# Patient Record
Sex: FEMALE | Race: WHITE | HISPANIC OR LATINO | Employment: UNEMPLOYED | URBAN - METROPOLITAN AREA
[De-identification: names, ages, dates, MRNs, and addresses within clinical notes are randomized per-mention and may not be internally consistent; named-entity substitution may affect disease eponyms.]

---

## 2019-10-16 ENCOUNTER — HOSPITAL ENCOUNTER (EMERGENCY)
Facility: HOSPITAL | Age: 34
Discharge: HOME | End: 2019-10-16
Attending: EMERGENCY MEDICINE
Payer: MEDICAID

## 2019-10-16 VITALS
HEART RATE: 79 BPM | SYSTOLIC BLOOD PRESSURE: 141 MMHG | DIASTOLIC BLOOD PRESSURE: 69 MMHG | HEIGHT: 69 IN | RESPIRATION RATE: 16 BRPM | TEMPERATURE: 99 F | BODY MASS INDEX: 36.9 KG/M2 | WEIGHT: 249.13 LBS | OXYGEN SATURATION: 96 %

## 2019-10-16 DIAGNOSIS — R03.0 ELEVATED BLOOD PRESSURE READING: ICD-10-CM

## 2019-10-16 DIAGNOSIS — R10.9 LEFT FLANK PAIN: Primary | ICD-10-CM

## 2019-10-16 DIAGNOSIS — N39.0 URINARY TRACT INFECTION WITHOUT HEMATURIA, SITE UNSPECIFIED: ICD-10-CM

## 2019-10-16 LAB
B-HCG UR QL: NEGATIVE
BILIRUB UR QL STRIP: NEGATIVE
CLARITY UR: CLEAR
COLOR UR: YELLOW
GLUCOSE UR QL STRIP: NEGATIVE
HGB UR QL STRIP: NEGATIVE
KETONES UR QL STRIP: NEGATIVE
LEUKOCYTE ESTERASE UR QL STRIP: ABNORMAL
MICROSCOPIC COMMENT: NORMAL
NITRITE UR QL STRIP: NEGATIVE
PH UR STRIP: 6 [PH] (ref 5–8)
PROT UR QL STRIP: NEGATIVE
SP GR UR STRIP: >=1.03 (ref 1–1.03)
URN SPEC COLLECT METH UR: ABNORMAL
UROBILINOGEN UR STRIP-ACNC: NEGATIVE EU/DL
WBC #/AREA URNS HPF: 2 /HPF (ref 0–5)

## 2019-10-16 PROCEDURE — 81025 URINE PREGNANCY TEST: CPT

## 2019-10-16 PROCEDURE — 99284 EMERGENCY DEPT VISIT MOD MDM: CPT | Mod: 25

## 2019-10-16 PROCEDURE — 81000 URINALYSIS NONAUTO W/SCOPE: CPT

## 2019-10-16 NOTE — ED NOTES
Pt c/o (L) flank pain x 1 week , with nausea.    Patient identifiers verified and correct for  Olivia Niel     LOC: The patient is awake, alert and aware of environment with an appropriate affect, the patient is oriented x 3 and speaking appropriately.  APPEARANCE: Patient resting comfortably and in no acute distress, patient is clean and well groomed, patient's clothing is properly fastened.  SKIN: The skin is warm and dry, color consistent with ethnicity, patient has normal skin turgor and moist mucus membranes, skin intact, no breakdown or bruising noted.   MUSCULOSKELETAL: Patient moving all extremities spontaneously, no obvious swelling or deformities noted.  RESPIRATORY: Airway is open and patent, respirations are spontaneous, patient has a normal effort and rate, no accessory muscle use noted, bilateral breath sounds clear.  CARDIAC: Patient has a normal rate and regular rhythm, no periphreal edema noted, capillary refill < 3 seconds.  ABDOMEN: Soft and non tender to palpation, no distention noted, normoactive bowel sounds present in all four quadrants.  NEUROLOGIC: PERRL, eyes open spontaneously, behavior appropriate to situation, follows commands, facial expression symmetrical, bilateral hand grasp equal and even, purposeful motor response noted, normal sensation in all extremities when touched with a finger.

## 2019-10-16 NOTE — ED PROVIDER NOTES
SCRIBE #1 NOTE: I, Robert Alonzo, am scribing for, and in the presence of, Joya Rm PA-C. I have scribed the entire note.       History     Chief Complaint   Patient presents with    Flank Pain     L flank x 1 week +nausea      Review of patient's allergies indicates:   Allergen Reactions    Pcn [penicillins]          History of Present Illness     HPI    10/16/2019, 5:21 PM   History obtained from the patient      History of Present Illness: Olivia Neil is a 34 y.o. female patient who presents to the Emergency Department for evaluation of L flank pain which onset gradually 2 week PTA.  Pt was seen at [place] just PTA and was diagnosed with a UTI and given abx but has not started. Pt reported to the ED because no imaging was preformed at [place].   Pt was given prescription for Levaquin and administered bentyl, Zofran, and a GI cocktail at [facility]. CBC showed slightly elevated WBC 11.13 with reference range of 4.3-10.3. CMP was normal results. Pregnancy test was negative. Urinalysis was positive for leukocyte esterase. Symptoms are worsening and moderate in severity. No mitigating or exacerbating factors reported. Associated sxs include nausea and increased burping. Patient denies any fever, chills, SOB, CP, abd pain, vomiting, constipation, dysuria, hematuria, back pain, HA, weakness, dizziness, and all other sxs at this time. Prior Tx includes abx but pt has not started taking them. No further complaints or concerns at this time.        Arrival mode: Personal vehicle    PCP: Primary Doctor No        Past Medical History:  History reviewed. No pertinent past medical history.    Past Surgical History:  Past Surgical History:   Procedure Laterality Date    CHOLECYSTECTOMY           Family History:  History reviewed. No pertinent family history.    Social History:  Social History     Tobacco Use    Smoking status: Never Smoker    Smokeless tobacco: Never Used   Substance and  Sexual Activity    Alcohol use: Yes    Drug use: Unknown    Sexual activity: Unknown        Review of Systems     Review of Systems   Constitutional: Negative for chills and fever.   HENT: Negative for sore throat.    Respiratory: Negative for shortness of breath.    Cardiovascular: Negative for chest pain.   Gastrointestinal: Positive for nausea. Negative for abdominal pain, constipation and vomiting.        (+) increasing belching   Genitourinary: Positive for flank pain (L). Negative for dysuria and hematuria.   Musculoskeletal: Negative for back pain.   Skin: Negative for rash.   Neurological: Negative for dizziness, weakness and headaches.   Hematological: Does not bruise/bleed easily.   All other systems reviewed and are negative.       Physical Exam     Initial Vitals [10/16/19 1716]   BP Pulse Resp Temp SpO2   (!) 141/69 79 16 98.7 °F (37.1 °C) 96 %      MAP       --          Physical Exam  Nursing Notes and Vital Signs Reviewed.  Constitutional: Patient is in no acute distress. Well-developed and well-nourished.  Head: Atraumatic. Normocephalic.  Eyes: PERRL. EOM intact. Conjunctivae are not pale. No scleral icterus.  ENT: Mucous membranes are moist. Oropharynx is clear and symmetric.    Neck: Supple. Full ROM. No lymphadenopathy.  Cardiovascular: Regular rate. Regular rhythm. No murmurs, rubs, or gallops. Distal pulses are 2+ and symmetric.  Pulmonary/Chest: No respiratory distress. Clear to auscultation bilaterally. No wheezing or rales.  Abdominal: Soft and non-distended.  There is no tenderness.  No rebound, guarding, or rigidity.  Genitourinary: L CVA tenderness  Musculoskeletal: Moves all extremities. No obvious deformities. No edema. No calf tenderness.  Skin: Warm and dry.  Neurological:  Alert, awake, and appropriate.  Normal speech.  No acute focal neurological deficits are appreciated.  Psychiatric: Normal affect. Good eye contact. Appropriate in content.     ED Course   Procedures  ED Vital  "Signs:  Vitals:    10/16/19 1716   BP: (!) 141/69   Pulse: 79   Resp: 16   Temp: 98.7 °F (37.1 °C)   TempSrc: Oral   SpO2: 96%   Weight: 113 kg (249 lb 1.9 oz)   Height: 5' 9" (1.753 m)       Abnormal Lab Results:  Labs Reviewed   URINALYSIS, REFLEX TO URINE CULTURE - Abnormal; Notable for the following components:       Result Value    Specific Gravity, UA >=1.030 (*)     Leukocytes, UA Trace (*)     All other components within normal limits    Narrative:     Preferred Collection Type->Urine, Clean Catch   PREGNANCY TEST, URINE RAPID   URINALYSIS MICROSCOPIC    Narrative:     Preferred Collection Type->Urine, Clean Catch        All Lab Results:  Results for orders placed or performed during the hospital encounter of 10/16/19   Urinalysis, Reflex to Urine Culture Urine, Clean Catch   Result Value Ref Range    Specimen UA Urine, Clean Catch     Color, UA Yellow Yellow, Straw, Martha    Appearance, UA Clear Clear    pH, UA 6.0 5.0 - 8.0    Specific Gravity, UA >=1.030 (A) 1.005 - 1.030    Protein, UA Negative Negative    Glucose, UA Negative Negative    Ketones, UA Negative Negative    Bilirubin (UA) Negative Negative    Occult Blood UA Negative Negative    Nitrite, UA Negative Negative    Urobilinogen, UA Negative <2.0 EU/dL    Leukocytes, UA Trace (A) Negative   Rapid Pregnancy, Urine   Result Value Ref Range    Preg Test, Ur Negative    Urinalysis Microscopic   Result Value Ref Range    WBC, UA 2 0 - 5 /hpf    Microscopic Comment SEE COMMENT          Imaging Results:  Imaging Results          CT Renal Stone Study ABD Pelvis WO (Final result)  Result time 10/16/19 19:22:29    Final result by Kenan Ignacio MD (10/16/19 19:22:29)                 Impression:      No acute abnormality identified.  No bowel obstruction, intra-abdominal abscess, free fluid, or free air.    Hepatomegaly with fatty infiltration of the liver.      Electronically signed by: Kenan Ignacio  Date:    10/16/2019  Time:    19:22             " Narrative:    EXAMINATION:  CT RENAL STONE STUDY ABD PELVIS WO    CLINICAL HISTORY:  Flank pain, stone disease suspected;    TECHNIQUE:  Low dose axial images, sagittal and coronal reformations were obtained from the lung bases to the pubic symphysis.  Contrast was not administered.    All CT scans at this location are performed using dose modulation techniques as appropriate to a performed exam including the following: Automated exposure control; adjustment of the mA and/or kV according to patient size.    COMPARISON:  None    FINDINGS:  Heart: Normal in size. No pericardial effusion.    Lung Bases: Well aerated, without consolidation or pleural fluid.    Liver: Hepatomegaly with fatty infiltration of the liver.    Gallbladder: Surgically absent    Bile Ducts: No evidence of dilated ducts.    Pancreas: No mass or peripancreatic fat stranding.    Spleen: Unremarkable.    Adrenals: Unremarkable.    Kidneys/ Ureters: Unremarkable.    Bladder: No evidence of wall thickening.    Reproductive organs: IUD noted in the lower uterine body, likely in satisfactory position.    GI Tract/Mesentery: No evidence of bowel obstruction or inflammation.  Normal appendix.  Moderate volume stool throughout the colon.    Peritoneal Space: No ascites. No free air.    Retroperitoneum: No significant adenopathy.    Abdominal wall: Unremarkable.    Vasculature: No significant atherosclerosis or aneurysm.    Bones: No acute fracture.                                 The Emergency Provider reviewed the vital signs and test results, which are outlined above.     ED Discussion     7:30 PM: Reassessed pt at this time.  Pt states her condition has improved at this time. Recommend patient follow-up with PCP regarding finding of hepatomegaly.  Discussed with pt all pertinent ED information and results. Discussed pt dx and plan of tx. Gave pt all f/u and return to the ED instructions. All questions and concerns were addressed at this time. Pt  expresses understanding of information and instructions, and is comfortable with plan to discharge. Pt is stable for discharge.    I discussed with patient and/or family/caretaker that evaluation in the ED does not suggest any emergent or life threatening medical conditions requiring immediate intervention beyond what was provided in the ED, and I believe patient is safe for discharge.  Regardless, an unremarkable evaluation in the ED does not preclude the development or presence of a serious of life threatening condition. As such, patient was instructed to return immediately for any worsening or change in current symptoms.      ED Medication(s):  Medications - No data to display    There are no discharge medications for this patient.      Follow-up Information     [facility]. Schedule an appointment as soon as possible for a visit in 3 days.    Contact information:  [address] [facility]  [place] [address]    Phone:  [phone]                       Medical Decision Making:   Clinical Tests:   Lab Tests: Ordered and Reviewed  Radiological Study: Ordered and Reviewed             Scribe Attestation:   Scribe #1: I performed the above scribed service and the documentation accurately describes the services I performed. I attest to the accuracy of the note.     Attending:   Physician Attestation Statement for Scribe #1: I, Joya Rm PA-C, personally performed the services described in this documentation, as scribed by Robert Alonzo, in my presence, and it is both accurate and complete.           Clinical Impression       ICD-10-CM ICD-9-CM   1. Left flank pain R10.9 789.09   2. Urinary tract infection without hematuria, site unspecified N39.0 599.0   3. Elevated blood pressure reading R03.0 796.2       Disposition:   Disposition: Discharged  Condition: Stable         Joya Rm PA-C  10/16/19 [id number]